# Patient Record
Sex: FEMALE | URBAN - METROPOLITAN AREA
[De-identification: names, ages, dates, MRNs, and addresses within clinical notes are randomized per-mention and may not be internally consistent; named-entity substitution may affect disease eponyms.]

---

## 2017-01-26 ENCOUNTER — ONCOLOGY VISIT (OUTPATIENT)
Dept: ONCOLOGY | Facility: CLINIC | Age: 37
End: 2017-01-26
Attending: GENETIC COUNSELOR, MS
Payer: COMMERCIAL

## 2017-01-26 DIAGNOSIS — Z80.41 FAMILY HISTORY OF MALIGNANT NEOPLASM OF OVARY: Primary | ICD-10-CM

## 2017-01-26 DIAGNOSIS — Z15.01 BRCA2 POSITIVE: ICD-10-CM

## 2017-01-26 DIAGNOSIS — Z80.42 FAMILY HISTORY OF PROSTATE CANCER: ICD-10-CM

## 2017-01-26 DIAGNOSIS — Z80.8 FAMILY HISTORY OF BRAIN CANCER: ICD-10-CM

## 2017-01-26 DIAGNOSIS — Z15.09 BRCA2 POSITIVE: ICD-10-CM

## 2017-01-26 PROCEDURE — 96040 ZZH GENETIC COUNSELING, EACH 30 MINUTES: CPT | Performed by: GENETIC COUNSELOR, MS

## 2017-01-26 NOTE — PROGRESS NOTES
1/26/2017    Referring Provider: self    Presenting Information:   Vee returned to the Cancer Risk Management Program at [facility] to discuss her genetic testing results. Her blood was drawn in December 2016. The Specific Site Analysis of BRCA2 test was ordered from [facility]. This testing was done because of her family history of ovarian and other cancers and a mutation in the BRCA2 gene identified in her family.    Genetic Testing Results: POSITIVE  Vee is POSITIVE for a BRCA2 mutation. Specifically her mutation is called [id number].  We discussed this mutation is associated with an increased risk for breast and ovarian cancer. We discussed the impact of this testing on Vee in detail. Vee was tested only for this single mutation in the BRCA2 gene.    BRCA2 Cancer Risks:  Women with mutations in BRCA2:    Have a 50-85% lifetime risk of developing breast cancer.    Lifetime ovarian cancer risk is also increased to 20%.    This is much greater than the general population breast and ovarian cancer risks of 12% and 1-2%, respectively.   Men with mutations in BRCA2:      Have a 20% lifetime prostate cancer risk.  This is greater than the general population risk of approximately 16%.      Male breast cancer risk is increased to 5-10%, This is greater than the general population risk of less than 1%.    Lifetime pancreatic cancer risk and melanoma risk in both males and females may be increased as well.   Cancer Screening and Prevention:  The following screening is recommended for women who have a mutation in the BRCA2 gene.    Breast self-exam starting at age 18; monthly     Clinical breast exams starting at age 25; repeated every 6-12 months    Annual breast MRI from age 25-29    Annual mammograms and breast MRI alternating every 6 months starting at age 30    Consider transvaginal ultrasound and a  blood test starting at age 30; repeated every 6 months    We discussed that prophylactic mastectomy is  a surgical option, which reduces breast cancer risk by 90%.  Chemoprevention with Tamoxifen can reduce breast cancer risk in some women.  Surgical risk reduction options and Tamoxifen use should be discussed with Vee's physician.      We have discussed the limitations of screening for ovarian cancer.  Thus, prophylactic removal of the ovaries and fallopian tubes is an option and is recommended after women are finished planning their families or between 35 and 40.  As with any surgery, risks are involved, and this option should be discussed in greater detail with a gyn-oncologist.      We discussed that using oral contraceptives for five years or more has been shown to reduce ovarian cancer risk by around 50%.  The data are still inconclusive as to whether or not using oral contraceptives will increase breast cancer risk in BRCA mutation carriers.     The following screening is recommended for men who have a mutation in the BRCA2 gene:    Breast self-exam starting at age 35    Annual clinical breast exams starting at age 35    Annual prostate exams and PSA testing starting at age 40    Screening for pancreatic cancer is not offered on a routine basis, as the benefits of current screening methods are unknown.  Pancreatic cancer screening may be considered based on family history.  Screening for melanoma may also be considered.  In rare situations in which both parents have a mutation in the BRCA2 gene, their children each have a 25% risk for Fanconi anemia. Fanconi anemia is a rare condition with onset in childhood.  Fanconi anemia often results in physical abnormalities, growth retardation, bone marrow failure, and increased risk for cancer.  In individuals of childbearing age with BRCA2 mutations, genetic counseling and genetic testing may be advised for their partners.    We discussed that Vee could participate in our Cancer Risk Management Program in which our nursing specialist provides an individual screening  "plan and assists with medical management. Vee made an appointment to see THOR Arriaga [facility] in a couple of weeks.    Implications for Family Members:  We reviewed that mutations in BRCA2 are inherited in an autosomal dominant pattern. This means that each of Vee's children have a 50% chance of inheriting the same mutation.  Likewise, her sister also has a 50% risk of having the same mutation. I am happy to help her relatives connect with a genetic counselor in their area if they would like to discuss testing.    Support Resources:  I provided Vee with handout that lists all the national and local support resources in our area.   Plan:  1.  I provided Vee with a copy of her test results and support resources today.  2.  I will provide a \"Dear Relative\" letter for Vee to share with her family members.  3.  She plans to follow up with Krupa Skinner in a few weeks in the Cancer Risk Management Program.    If  Vee has additional questions, I encouraged her to contact  me directly at .  Time spent face to face: 25  minutes.  Janie Gonzalez MS [facility]  Genetic Counselor  [phone]  "

## 2017-01-26 NOTE — PATIENT INSTRUCTIONS
Assessing Cancer Risk  Only about 5-10% of cancers are thought to be due to an inherited cancer susceptibility gene.    These families often have:    Several people with the same or related types of cancer    Cancers diagnosed at a young age (before age 50)    Individuals with more than one primary cancer    Multiple generations of the family affected with cancer    BRCA1 and BRCA2 Genes  We each inherit two copies of every gene in our bodies: one from our mother, and one from our father.  Each gene has a specific job to do.  When a gene has a mistake or  mutation  in it, it does not work like it should.  Everyone has two copies of BRCA1 and two copies of BRCA2.  A single mutation in one of the copies of BRCA1 or BRCA2 increases the risk for breast and ovarian cancer, among others.  The risk for pancreatic cancer and melanoma may also be slightly increased in some families.  The tables below list the chance that someone with a BRCA mutation would develop cancer in his or her lifetime1,2,3,4.      Women   Men    General Population BRCA +   General Population BRCA +   Breast 12% 40-85%  Breast <1% ~7%   Ovarian 1-2% 10-40%  Prostate 16% 20%     Inheriting a mutation does not mean a person will develop cancer, but it does significantly increase his or her risk above the general population risk.    A person s ethnic background is also important to consider, as individuals of Ashkenazi Christian ancestry have a higher chance of having a BRCA gene mutation.  There are three BRCA mutations that occur more frequently in this population.    Genetic Testing  Genetic testing involves a simple blood test and will look at the genetic information in the BRCA1 and BRCA2 genes for any harmful mutations that are associated with increased cancer risk.  If possible, it is recommended that the person(s) who has had cancer be tested before other family members.  That person will give us the most useful information about whether or not  a specific gene is associated with the cancer in the family.     Results  There are three possible results of BRCA1 and BRCA2 genetic testing:    Positive--a harmful mutation was identified    Negative--no mutation was identified    Variant of unknown significance--a variation in one of the genes was identified, but it is unclear how this impacts cancer risk in the family  Advantages and Disadvantages  There are advantages and disadvantages to genetic testing of these genes.    Advantages    May clarify your cancer risk    Can help you make medical decisions    May explain the cancers in your family    May give useful information to your family members (if you share your results)    Disadvantages    Possible negative emotional impact of learning about inherited cancer risk    Uncertainty in interpreting a negative test result in some situations    Possible genetic discrimination concerns (see below)    Inheritance   BRCA1 and BRCA2 mutations are inherited in an autosomal dominant pattern.  This means that if a parent has a mutation, each of his or her children will have a 50% chance of inheriting that same mutation.  Therefore, each child--male or female--would have a 50% chance of being at increased risk for developing cancer.                                              Image obtained from Genetics Home Reference, 2013     Genetic Information Nondiscrimination Act (COARL)  CORAL is a federal law that protects individuals from health insurance or employment discrimination based on a genetic test result alone.  Although rare, there are currently no legal protections in terms of life insurance, long term care, or disability insurances.  Visit the National Human Genome Research [place] at Genome.gov/94996794 to learn more.    Reducing Cancer Risk  Current screening recommendations for women with a BRCA mutation include5:    Breast:  o Breast self-exams starting at age 18; monthly  o Clinical breast exams starting at  age 25; repeated every 6-12 months  o Annual breast MRI starting at age 25 (or possibly younger)  o Annual mammogram and breast MRI at age 30 (for women with and without a breast cancer history)  o Consideration of preventative mastectomy    Ovarian:   o Consideration of transvaginal ultrasound and CA-125 blood test starting at age 30 (or possibly younger); repeated every 6 months  o Recommendation for surgery to remove the ovaries and fallopian tubes after child bearing or by age 35-40     Current screening recommendations for men with a BRCA mutation include5:    Breast:  o Self-breast exams starting at age 35  o Annual clinical breast exams starting at age 35    Prostate:   o Recommend prostate cancer screening starting at age 40 for BRCA2 carriers  o Consider prostate cancer screening starting at age 40 for BRCA1 carriers    Both men and women with BRCA mutations should talk to their doctor or genetic counselor about screening for pancreatic cancer and melanoma.  In addition, the age at which to start screening may be modified based on family history of young cancer.    Questions to Think About Regarding Genetic Testing    What effect will the test result have on me and my relationship with my family members if I have an inherited gene mutation?  If I don t have a gene mutation?    Should I share my test results, and how will my family react to this news, which may also affect them?    Are my children ready to learn new information that may one day affect their own health?    Resources  FORCE: Facing Our Risk of Cancer Empowered facingourrisk.org   [place] Pink bebrightpink.org   American Cancer Society (ACS) cancer.org   National Cancer [place] (NCI) cancer.gov     Please call us if you have any questions or concerns.     Cancer Risk Management Program [phone]  ? Janie Gonzalez, MS, [facility]  [phone]  ? Nellie Hines, MS [facility]          [phone]  ? Brooke Erickson MS,  [facility]  [phone]  ? Georgina Johnson, MS, [facility]  [phone]      Resources for Hereditary Breast and Ovarian Cancer  Bright Pink    www.MojoPages.org  [employer] is the only national non-profit organization focused on prevention and early detection of breast and ovarian cancer in young women.  Bright Pink aims to reach the 52 million young women in the [place] between the ages of 18 and 45 with innovative, life-saving breast and ovarian health programs, thereby empowering this and future generations of women to live healthier, happier, and longer lives.  FORCE: Facing Our Risk of Cancer Empowered  www.facingChosen.fm.org  FORCE s mission is to improve the lives of individuals and families affected by hereditary breast, ovarian, and related cancers by creating awareness, supplying information and support to the community, advocating for and supporting research, and working with the research and medical communities.     Helpline: 1-171.795.4543    Message boards    Peer Navigation and local support groups  Talking About BRCA in Your Family Tree  http://www.Ambric.org/understanding-brca-and-hboc/publications/documents/ryvvdpx-cixikhc-lokyg-brca-family.pdf  MOCA: [place] Ovarian Cancer [place] http://mnovarian.org/  MOCA was started in 1999 by a small group of ovarian cancer survivors who came together to fund ovarian cancer research, raise awareness of the disease, and provide support to women with ovarian cancer and their families.  Firefly Sisterhood   www.fireflysisterhood.org  Based in the [facility], the Firefly Sisterhood connects women diagnosed with breast cancer with trained breast cancer survivors to offer support, guidance and hope.  [employer] [place] www.Beloor[employer] Biotechsclubtwincities.org  [employer] [place] is a 501(c)3 nonprofit and the local affiliate of the Cancer Support Community, a network of more than 54 supportive, free and welcoming  clubhouses  where everyone living with cancer can  come for social, emotional and psychological support. Clubs are healing environments where individuals learn from each other with guidance from licensed professionals.  BRCA Information Support Group  People with BRCA1 or BRAC2 mutations face complex emotional challenges and complicated medical decisions due to high cancer risks and their impact on individuals and families. This group brings people with a BRAC1 or BRAC2 genetic diagnosis together with others facing similar challenges. The group meets nine times per year. For information, please contact [email or web address] or call [phone].  Books:  The Breast Reconstruction Guidebook: Issues and Answers from Research to Recovery, Elizabeth Sanchez  Confronting Hereditary Breast and Ovarian Cancer: Identify Your Risk, Understand Your Options, Change Your Geeta, Sendy Cantor

## 2017-01-26 NOTE — Clinical Note
Cancer Risk Management  Program Locations    [facility]  [facility]  [facility]  Mailing Address  Cancer Risk Management Program  [facility]  [address]   [id number]    New patient appointments  [phone]  January 26, 2017    Vee Rae  [address] [address]      Dear Vee,  This is a summary of your genetic counseling visit to discuss your genetic test results.    1/26/2017    Referring Provider: self    Presenting Information:   Vee returned to the Cancer Risk Management Program at [facility] to discuss her genetic testing results. Her blood was drawn in December 2016. The Specific Site Analysis of BRCA2 test was ordered from [facility]. This testing was done because of her family history of ovarian and other cancers and a mutation in the BRCA2 gene identified in her family.    Genetic Testing Results: POSITIVE  Vee is POSITIVE for a BRCA2 mutation. Specifically her mutation is called [id number].  We discussed this mutation is associated with an increased risk for breast and ovarian cancer. We discussed the impact of this testing on Vee in detail. Vee was tested only for this single mutation in the BRCA2 gene.    BRCA2 Cancer Risks:  Women with mutations in BRCA2:    Have a 50-85% lifetime risk of developing breast cancer.    Lifetime ovarian cancer risk is also increased to 20%.    This is much greater than the general population breast and ovarian cancer risks of 12% and 1-2%, respectively.   Men with mutations in BRCA2:      Have a 20% lifetime prostate cancer risk.  This is greater than the general population risk of approximately 16%.      Male breast cancer risk is increased to 5-10%, This is greater than the general population risk of less than 1%.    Lifetime pancreatic  cancer risk and melanoma risk in both males and females may be increased as well.   Cancer Screening and Prevention:  The following screening is recommended for women who have a mutation in the BRCA2 gene.    Breast self-exam starting at age 18; monthly     Clinical breast exams starting at age 25; repeated every 6-12 months    Annual breast MRI from age 25-29    Annual mammograms and breast MRI alternating every 6 months starting at age 30    Consider transvaginal ultrasound and a  blood test starting at age 30; repeated every 6 months    We discussed that prophylactic mastectomy is a surgical option, which reduces breast cancer risk by 90%.  Chemoprevention with Tamoxifen can reduce breast cancer risk in some women.  Surgical risk reduction options and Tamoxifen use should be discussed with Vee's physician.      We have discussed the limitations of screening for ovarian cancer.  Thus, prophylactic removal of the ovaries and fallopian tubes is an option and is recommended after women are finished planning their families or between 35 and 40.  As with any surgery, risks are involved, and this option should be discussed in greater detail with a gyn-oncologist.      We discussed that using oral contraceptives for five years or more has been shown to reduce ovarian cancer risk by around 50%.  The data are still inconclusive as to whether or not using oral contraceptives will increase breast cancer risk in BRCA mutation carriers.     The following screening is recommended for men who have a mutation in the BRCA2 gene:    Breast self-exam starting at age 35    Annual clinical breast exams starting at age 35    Annual prostate exams and PSA testing starting at age 40    Screening for pancreatic cancer is not offered on a routine basis, as the benefits of current screening methods are unknown.  Pancreatic cancer screening may be considered based on family history.  Screening for melanoma may also be considered.  In  "rare situations in which both parents have a mutation in the BRCA2 gene, their children each have a 25% risk for Fanconi anemia. Fanconi anemia is a rare condition with onset in childhood.  Fanconi anemia often results in physical abnormalities, growth retardation, bone marrow failure, and increased risk for cancer.  In individuals of childbearing age with BRCA2 mutations, genetic counseling and genetic testing may be advised for their partners.    We discussed that Vee could participate in our Cancer Risk Management Program in which our nursing specialist provides an individual screening plan and assists with medical management. Vee made an appointment to see THOR Arriaga [facility] in a couple of weeks.    Implications for Family Members:  We reviewed that mutations in BRCA2 are inherited in an autosomal dominant pattern. This means that each of Vee's children have a 50% chance of inheriting the same mutation.  Likewise, her sister also has a 50% risk of having the same mutation. I am happy to help her relatives connect with a genetic counselor in their area if they would like to discuss testing.    Support Resources:  I provided Vee with handout that lists all the national and local support resources in our area.   Plan:  1.  I provided Vee with a copy of her test results and support resources today.  2.  I will provide a \"Dear Relative\" letter for Vee to share with her family members.  3.  She plans to follow up with Krupa Skinner in a few weeks in the Cancer Risk Management Program.    If  Vee has additional questions, I encouraged her to contact  me directly at .  Time spent face to face: 25  minutes.  Please call me if you have any additional questions.    Sincerely,    Janie Gonzalez, MS [facility]  Genetic Counselor  [phone] (phone)                      "

## 2017-01-26 NOTE — MR AVS SNAPSHOT
After Visit Summary   1/26/2017    Vee Rae    MRN: [id number]           Patient Information     Date Of Birth          1980        Visit Information        Provider Department      1/26/2017 10:15 AM Janie Gonzalez GC Cancer Risk Management Program        Care Instructions        Assessing Cancer Risk  Only about 5-10% of cancers are thought to be due to an inherited cancer susceptibility gene.    These families often have:    Several people with the same or related types of cancer    Cancers diagnosed at a young age (before age 50)    Individuals with more than one primary cancer    Multiple generations of the family affected with cancer    BRCA1 and BRCA2 Genes  We each inherit two copies of every gene in our bodies: one from our mother, and one from our father.  Each gene has a specific job to do.  When a gene has a mistake or  mutation  in it, it does not work like it should.  Everyone has two copies of BRCA1 and two copies of BRCA2.  A single mutation in one of the copies of BRCA1 or BRCA2 increases the risk for breast and ovarian cancer, among others.  The risk for pancreatic cancer and melanoma may also be slightly increased in some families.  The tables below list the chance that someone with a BRCA mutation would develop cancer in his or her lifetime1,2,3,4.      Women   Men    General Population BRCA +   General Population BRCA +   Breast 12% 40-85%  Breast <1% ~7%   Ovarian 1-2% 10-40%  Prostate 16% 20%     Inheriting a mutation does not mean a person will develop cancer, but it does significantly increase his or her risk above the general population risk.    A person s ethnic background is also important to consider, as individuals of Ashkenazi Judaism ancestry have a higher chance of having a BRCA gene mutation.  There are three BRCA mutations that occur more frequently in this population.    Genetic Testing  Genetic testing involves a simple blood test and will look at the genetic  information in the BRCA1 and BRCA2 genes for any harmful mutations that are associated with increased cancer risk.  If possible, it is recommended that the person(s) who has had cancer be tested before other family members.  That person will give us the most useful information about whether or not a specific gene is associated with the cancer in the family.     Results  There are three possible results of BRCA1 and BRCA2 genetic testing:    Positive--a harmful mutation was identified    Negative--no mutation was identified    Variant of unknown significance--a variation in one of the genes was identified, but it is unclear how this impacts cancer risk in the family  Advantages and Disadvantages  There are advantages and disadvantages to genetic testing of these genes.    Advantages    May clarify your cancer risk    Can help you make medical decisions    May explain the cancers in your family    May give useful information to your family members (if you share your results)    Disadvantages    Possible negative emotional impact of learning about inherited cancer risk    Uncertainty in interpreting a negative test result in some situations    Possible genetic discrimination concerns (see below)    Inheritance   BRCA1 and BRCA2 mutations are inherited in an autosomal dominant pattern.  This means that if a parent has a mutation, each of his or her children will have a 50% chance of inheriting that same mutation.  Therefore, each child--male or female--would have a 50% chance of being at increased risk for developing cancer.                                              Image obtained from Genetics Home Reference, 2013     Genetic Information Nondiscrimination Act (CORAL)  CORAL is a federal law that protects individuals from health insurance or employment discrimination based on a genetic test result alone.  Although rare, there are currently no legal protections in terms of life insurance, long term care, or disability  insurances.  Visit the National Human [employer] Research [employer]re at Genome.gov/43185091 to learn more.    Reducing Cancer Risk  Current screening recommendations for women with a BRCA mutation include5:    Breast:  o Breast self-exams starting at age 18; monthly  o Clinical breast exams starting at age 25; repeated every 6-12 months  o Annual breast MRI starting at age 25 (or possibly younger)  o Annual mammogram and breast MRI at age 30 (for women with and without a breast cancer history)  o Consideration of preventative mastectomy    Ovarian:   o Consideration of transvaginal ultrasound and CA-125 blood test starting at age 30 (or possibly younger); repeated every 6 months  o Recommendation for surgery to remove the ovaries and fallopian tubes after child bearing or by age 35-40     Current screening recommendations for men with a BRCA mutation include5:    Breast:  o Self-breast exams starting at age 35  o Annual clinical breast exams starting at age 35    Prostate:   o Recommend prostate cancer screening starting at age 40 for BRCA2 carriers  o Consider prostate cancer screening starting at age 40 for BRCA1 carriers    Both men and women with BRCA mutations should talk to their doctor or genetic counselor about screening for pancreatic cancer and melanoma.  In addition, the age at which to start screening may be modified based on family history of young cancer.    Questions to Think About Regarding Genetic Testing    What effect will the test result have on me and my relationship with my family members if I have an inherited gene mutation?  If I don t have a gene mutation?    Should I share my test results, and how will my family react to this news, which may also affect them?    Are my children ready to learn new information that may one day affect their own health?    Resources  FORCE: Facing Our Risk of Cancer Empowered facingourrisk.org   Bright Pink bebrightpink.org   American Cancer Society (ACS) cancer.org    National Cancer [place] (NCI) cancer.gov     Please call us if you have any questions or concerns.     Cancer Risk Management Program [phone]  ? Janie Gonzalez, MS, [facility]  [phone]  ? Nellie Hines, MS [facility]          [phone]  ? Brooke Erickson, MS, [facility]  [phone]  ? Georgina Johnson, MS, [facility]  [phone]      Resources for Hereditary Breast and Ovarian Cancer  Bright Pink    www.Catarizm.org  Bright Pink is the only national non-profit organization focused on prevention and early detection of breast and ovarian cancer in young women.  Bright Pink aims to reach the 52 million young women in the [place] between the ages of 18 and 45 with innovative, life-saving breast and ovarian health programs, thereby empowering this and future generations of women to live healthier, happier, and longer lives.  FORCE: Facing Our Risk of Cancer Empowered  www.Pogoapp.org  FORCE s mission is to improve the lives of individuals and families affected by hereditary breast, ovarian, and related cancers by creating awareness, supplying information and support to the community, advocating for and supporting research, and working with the research and medical communities.     Helpline: 1-389.672.9199    Message boards    Peer Navigation and local support groups  Talking About BRCA in Your Family Tree  http://www.facingShipping Company.org/understanding-brca-and-hboc/publications/documents/xjlumvm-beomrws-upbaj-brca-family.pdf  MOCA: [place] Ovarian Cancer [place] http://mnovarian.org/  MOCA was started in 1999 by a small group of ovarian cancer survivors who came together to fund ovarian cancer research, raise awareness of the disease, and provide support to women with ovarian cancer and their families.  Firefly Sisterhood   www.ReCellularsisterhood.org  Based in the [facility] the Firefly Sisterhood connects women diagnosed with breast cancer with trained breast cancer survivors to offer support, guidance  and hope.  [employer] [place] www.deviMarco Vascosclubtwincities.org  [employer] [place] is a 501(c)3 nonprofit and the local affiliate of the Cancer Support Community, a network of more than 54 supportive, free and welcoming  clubhouses  where everyone living with cancer can come for social, emotional and psychological support. Clubs are healing environments where individuals learn from each other with guidance from licensed professionals.  BRCA Information Support Group  People with BRCA1 or BRAC2 mutations face complex emotional challenges and complicated medical decisions due to high cancer risks and their impact on individuals and families. This group brings people with a BRAC1 or BRAC2 genetic diagnosis together with others facing similar challenges. The group meets nine times per year. For information, please contact [email or web address]@[employer] or call [phone].  Books:  The Breast Reconstruction Guidebook: Issues and Answers from Research to Recovery, Elizabeth Sanchez  Confronting Hereditary Breast and Ovarian Cancer: Identify Your Risk, Understand Your Options, Change Your Geeta, Sendy Cantor        Follow-ups after your visit        Who to contact     If you have questions or need follow up information about today's clinic visit or your schedule please contact CANCER RISK MANAGEMENT PROGRAM directly at [phone].  Normal or non-critical lab and imaging results will be communicated to you by MyChart, letter or phone within 4 business days after the clinic has received the results. If you do not hear from us within 7 days, please contact the clinic through MyChart or phone. If you have a critical or abnormal lab result, we will notify you by phone as soon as possible.  Submit refill requests through [employer] or call your pharmacy and they will forward the refill request to us. Please allow 3 business days for your refill to be completed.          Additional Information About Your Visit       "  MyChart Information     [employer] lets you send messages to your doctor, view your test results, renew your prescriptions, schedule appointments and more. To sign up, go to [email or web address]/[email or web address] . Click on \"Log in\" on the left side of the screen, which will take you to the Welcome page. Then click on \"Sign up Now\" on the right side of the page.     You will be asked to enter the access code listed below, as well as some personal information. Please follow the directions to create your username and password.     Your access code is: [email or web address]  Expires: 3/7/2017 12:31 PM     Your access code will  in 90 days. If you need help or a new code, please call your [place] clinic or [phone].        Care EveryWhere ID     This is your Care EveryWhere ID. This could be used by other organizations to access your [place] medical records  [id number]         Blood Pressure from Last 3 Encounters:   No data found for BP    Weight from Last 3 Encounters:   No data found for Wt              Today, you had the following     No orders found for display       Primary Care Provider Office Phone # Fax #    Marianne Uribe [phone]       [facility]  [address]  [facility] [address]        Thank you!     Thank you for choosing CANCER RISK MANAGEMENT PROGRAM  for your care. Our goal is always to provide you with excellent care. Hearing back from our patients is one way we can continue to improve our services. Please take a few minutes to complete the written survey that you may receive in the mail after your visit with us. Thank you!             Your Updated Medication List - Protect others around you: Learn how to safely use, store and throw away your medicines at www.disposemymeds.org.      Notice  As of 2017 10:17 AM    You have not been prescribed any medications.      "

## 2017-01-26 NOTE — Clinical Note
Cancer Risk Management  Program [facility]  [facility]  Mailing Address  Cancer Risk Management Program  [facility]  [address]  [place] [address]    New patient appointments  [phone]  1/26/2017    Dear Relative:  The purpose of this letter is to inform you that your relative recently underwent genetic counseling and genetic testing due to her family historyof ovarian cancer.  The testing done through [employer] identified a mutation in the BRCA2 gene.  Specifically, the mutation is called [id number].  A mutation (or change in the genetic code) causes a specific gene to stop working properly.  Ultimately, individuals who have a mutation in this BRCA2 gene have a diagnosis of hereditary breast and ovarian cancer syndrome.    BRCA2 mutations increase the lifetime risk of breast cancer up to 50-85%.  They also increase the lifetime risk for ovarian cancer up to 10-20%.  Men with this BRCA2 mutation are at increased risk for male breast cancer and prostate cancer.  Some families with BRCA2 mutations show increased risk of pancreatic cancer or melanoma.  In addition, both men and women have a 50% chance of passing this mutation on to each of their children.  If individuals are found to have a mutation in the BRCA2 gene, we would recommend increased cancer screening at younger ages.  Risk reduction surgeries are also available options that can prevent the development of certain cancers.  In rare situations in which both parents have a mutation in the BRCA2 gene, their children each have a 25% risk for Fanconi anemia. Fanconi anemia is a rare condition with onset in childhood.  Fanconi anemia often results in physical abnormalities, growth retardation, bone marrow failure, and increased risk  for cancer.  In individuals of childbearing age with BRCA2 mutations, genetic counseling and genetic testing may be advised for their partners.  I understand that this may be surprising, unexpected, and even scary news.  Because this mutation has been identified in your family, you are at risk for having the same mutation.  As mentioned earlier, your children may also be at risk.  Thus, I encourage you to contact me with questions or to schedule a genetic counseling appointment.  If you do not live in the [facility] area, I would be happy to provide you with contact information for genetic counselors in your city or state.  Genetic counselors can be found by visiting www.nsgc.org.   Scheduling a genetic counseling appointment does not mean you have to undergo genetic testing.  The decision to pursue such testing is a very personal one that is discussed in more detail during the session.  Indeed, much of cancer genetic counseling is providing valuable information to individuals who are impacted by genetic information such as this.    Please feel free to contact me with any questions or concerns.  I can be reached at .  Sincerely,   Janie Gonzalez MS [facility]  Genetic Counselor

## 2017-02-07 DIAGNOSIS — Z91.89 AT RISK FOR CANCER: ICD-10-CM

## 2017-02-07 DIAGNOSIS — Z15.01 BRCA2 POSITIVE: ICD-10-CM

## 2017-02-07 DIAGNOSIS — Z12.39 BREAST CANCER SCREENING, HIGH RISK PATIENT: Primary | ICD-10-CM

## 2017-02-07 DIAGNOSIS — Z80.41 FAMILY HISTORY OF OVARIAN CANCER: ICD-10-CM

## 2017-02-07 DIAGNOSIS — Z15.09 BRCA2 POSITIVE: ICD-10-CM

## 2017-02-09 ENCOUNTER — ONCOLOGY VISIT (OUTPATIENT)
Dept: ONCOLOGY | Facility: CLINIC | Age: 37
End: 2017-02-09
Attending: CLINICAL NURSE SPECIALIST
Payer: COMMERCIAL

## 2017-02-09 ENCOUNTER — HOSPITAL ENCOUNTER (OUTPATIENT)
Dept: ULTRASOUND IMAGING | Facility: CLINIC | Age: 37
Discharge: HOME | End: 2017-02-09
Attending: CLINICAL NURSE SPECIALIST | Admitting: CLINICAL NURSE SPECIALIST
Payer: COMMERCIAL

## 2017-02-09 VITALS
BODY MASS INDEX: 20.25 KG/M2 | TEMPERATURE: 99.8 F | DIASTOLIC BLOOD PRESSURE: 89 MMHG | WEIGHT: 129 LBS | OXYGEN SATURATION: 99 % | HEART RATE: 76 BPM | SYSTOLIC BLOOD PRESSURE: 129 MMHG | HEIGHT: 67 IN | RESPIRATION RATE: 16 BRPM

## 2017-02-09 DIAGNOSIS — Z80.41 FAMILY HISTORY OF OVARIAN CANCER: ICD-10-CM

## 2017-02-09 DIAGNOSIS — Z15.01 BRCA2 POSITIVE: ICD-10-CM

## 2017-02-09 DIAGNOSIS — Z15.09 BRCA2 POSITIVE: ICD-10-CM

## 2017-02-09 DIAGNOSIS — Z91.89 AT RISK FOR CANCER: ICD-10-CM

## 2017-02-09 LAB — CANCER AG125 SERPL-ACNC: 36 U/ML (ref 0–30)

## 2017-02-09 PROCEDURE — 36415 COLL VENOUS BLD VENIPUNCTURE: CPT

## 2017-02-09 PROCEDURE — 76830 TRANSVAGINAL US NON-OB: CPT

## 2017-02-09 PROCEDURE — 99215 OFFICE O/P EST HI 40 MIN: CPT | Performed by: CLINICAL NURSE SPECIALIST

## 2017-02-09 PROCEDURE — 86304 IMMUNOASSAY TUMOR CA 125: CPT | Performed by: CLINICAL NURSE SPECIALIST

## 2017-02-09 PROCEDURE — 99211 OFF/OP EST MAY X REQ PHY/QHP: CPT

## 2017-02-09 ASSESSMENT — PAIN SCALES - GENERAL: PAINLEVEL: NO PAIN (0)

## 2017-02-09 NOTE — MR AVS SNAPSHOT
After Visit Summary   2/9/2017    Vee Rae    MRN: [id number]           Patient Information     Date Of Birth          1980        Visit Information        Provider Department      2/9/2017 9:00 AM Krupa Skinner APRN [facility]        Care Instructions    Individualized Surveillance Plan for women  Hereditary Breast and/or Ovarian Cancer Syndrome   Per NCCN Guidelines Version 2.2017   Recommended screening Test or procedure Last done Next Scheduled    Breast self awareness- starting at age 18. Women should be familiar with their breasts and promptly report changes to their care provider. Periodic, consistent self exam may facilitate breast self awareness.  2/9/2017 Sept. 2017   Breast screening, starting at age 25 Clinical breast exams every 6 -12 months 2/9/2017 Sept. 2017   Breast screening   Age 25-29 Annual breast MRI screening with contrast (preferred) or mammogram if MRI is unavailable or individualized based on family history if a breast cancer diagnosis before age 30 is present.       Breast MRI is performed preferably on day 7-15 of menstrual cycle for premenopausal women.   NA   NA   Breast screening   Age >30-75 years     Annual mammogram and   annual breast MRI, preferably on day 7-15 of menstrual cycle for premenopausal women.    Age>75 years, management should be considered on an individual basis. Baseline mammogram ordered today NEXT: Breast MRI in August 2017 after exam   Ovarian cancer screening, starting at age 30-35 Consider transvaginal ultrasound and  tests.  and pelvic US ordered today Sept. 2017   Recommendation: risk-reducing salpingo-oophorectomy, typically between 35 and 40 years old and  upon completion of child bearing.     Because ovarian cancer onset in patients with BRCA2 mutations is on average of 8-10 years later than in patients with BRCA1 mutations, it is reasonable to delay risk-reducing salpingo-oophorectomy until  40-45 years in patients with BRCA2 mutations who have already maximized their breast cancer prevention (i.e., undergone bilateral mastectomy).    Salpingectomy alone is not the standard of care for risk reduction although clinical trials are ongoing.     Discuss option of risk-reducing mastectomy.    Consider risks and benefits of risk reducing agents, such as tamoxifen and raloxifene for breast and ovarian cancer.    Consider a full body skin and eye exam for melanoma screening for both BRCA1+ and BRCA2+.     NOTE: Women with BRCA mutation who are treated for breast cancer should have screening of the remaining breast tissue with annual mammography and breast MRI.    The International Cancer of the Pancreas Screening (CAPS) Consortium recommends that individuals with a BRCA2 mutation who have at least one first-degree relative with pancreatic cancer should undergo initial screening with endoscopic ultrasonography and/or MRI/magnetic resonance cholangiopancreatography.         Magnetic Resonance Imaging (MRI) of the Breast  Magnetic resonance imaging (MRI) of the breast is an imaging test that uses strong magnets and radio waves to form pictures of the inside of the breast. It also creates images of the tissues that surround the breast. Breast MRI is used to check for problems, such as a leaking breast implant or a suspicious lump or mass. It can also be used to help determine if breast cancer is present and aid in diagnosis and management. Most MRI tests take 30 to 60 minutes. Depending on the type of MRI you are having, the test may take longer. Give yourself extra time to check in.      During a breast MRI, you lie face down on a platform that slides into a tubelike machine called a scanner.   Before your test    You may need to stop eating or drinking before the test. Each health care facility has its own guidelines on this. It also depends on the type of exam you are having. Ask your health care provider if you  should stop eating or drinking before the test.    Ask your provider if you should stop taking any medicine before the test.    Follow your normal daily routine unless your provider tells you otherwise.    You ll be asked to remove your watch, hearing aids, credit cards, pens, eyeglasses, and other metal objects.    You may be asked to remove your makeup. Makeup may contain some metal.  MRI uses strong magnets. Metal is affected by magnets and can distort the image. The magnet used in MRI can cause metal objects in your body to move. If you have a metal implant, you may not be able to have an MRI unless the implant is certified as MRI safe. People with these implants should not have an MRI:    Ear (cochlear) implants    Certain clips used for brain aneurysms    Certain metal coils put in blood vessels    Most defibrillators    Most pacemakers  The radiologist or technologist may ask you if you:    Have had stereotactic breast biopsy    Have a pacemaker    Have an artificial body part (prosthesis)    Have metal rods, screws, plates, or splinters in your body    Wear a medicated adhesive patch    Have tattoos or body piercings. Some tattoo inks contain metal.    Have implanted nerve stimulators or drug-infusion ports    Work with metal    Have braces. You must remove any dental work.    Have a bullet or other metal in your body  Tell the radiologist or technologist if you:    Are allergic to X-ray dye (contrast medium), iodine, shellfish, or any medicines    Are pregnant or think you may be pregnant    Are afraid of small, enclosed spaces (claustrophobic)    Have any serious health problems. This includes kidney disease or a liver transplant. You may not be able to have the contrast material used for MRI.    Have had previous surgeries    Are breastfeeding   During your test    You may be asked to wear a hospital gown.    You may be given earplugs to wear if you desire.    You may be injected with contrast. This is  a special dye that makes the MRI image sharp that may be needed depending on the reason for your exam.    You ll lie on a platform that slides into a tubelike machine called a scanner. You ll be on your stomach with your breasts placed through openings in the platform.    Remain as still as you can while the camera takes the pictures. This will ensure the best images.  After your test    You can get back to normal activities right away.    If you were given contrast, it will pass naturally through your body within a day.    Drink lots of water so that the dye passes quickly out of your body.  Getting your results  Your health care provider will discuss the test results with you during a follow-up appointment or over the phone.    3695-5512 [employer]. [address] [place] [address], [place] [address]. All rights reserved. This information is not intended as a substitute for professional medical care. Always follow your healthcare professional's instructions.        Mammography  Mammography is an X-ray exam of your breast tissue. The image it makes is called a mammogram. A mammogram can help find problems with your breasts, such as cysts or cancer. Mammography is the best breast cancer screening tool available.     Have screening mammograms and professional breast exams as often as your healthcare provider recommends. Also, be sure you know how your breasts normally look and feel. This makes it easier to notice any changes. Report changes to your healthcare provider as soon as possible.    How do I get ready for a mammogram?     Schedule the test for 1 week after your period. Your breasts are less sore then.    Make sure your clinic gets images of your last mammogram if it was done somewhere else. This lets the provider compare the 2 sets of images for any changes.    On the morning of your test, don t use deodorant, powder, or perfume.    Wear a top that you can take off easily.    What happens during a  mammogram?     You will need to undress from the waist up.    The technologist will position your breast to get the best test results.    Each of your breasts will be compressed one at a time. This helps get the most complete X-ray image.    Your breasts will be repositioned to get at least 2 separate views of each breast.  What happens after a mammogram?    More X-rays are sometimes needed. You ll be called to schedule them.    You should receive your test results in writing. Ask about this on the day of your appointment.    Have mammograms as often as your healthcare provider recommends.  Let the technologist know if:    You re pregnant or think you may be pregnant    You have breast implants    You have any scars or moles on or near your breasts    You ve had a breast biopsy or surgery    You re breastfeeding     0151-9709 [employer]. [address] [place] [address]. All rights reserved. This information is not intended as a substitute for professional medical care. Always follow your healthcare professional's instructions.        Transvaginal Ultrasound (Endovaginal Ultrasound)  A transvaginal ultrasound is an imaging test. An ultrasound uses sound waves to form pictures of your organs that can be seen on a screen. It is often done with a probe placed on the belly. A transvaginal ultrasound uses a special probe that is put into your vagina. It uses sound waves to make pictures of your uterus, ovaries, and other pelvic organs. This test can be used to check symptoms such as pain. It can also check for problems. In pregnant women, it is used to check the unborn baby or fetus. The test is often done by a specially trained technologist called a sonographer.    Getting ready for your test    You may be asked to go to the bathroom. Your bladder may need to be empty before the test.    Tell the sonographer what medicines you take. Let him or her know if you have had pelvic surgery.    Answer  any other questions the sonographer asks. Your answers will help him or her adapt the test to your health needs.  During your test    You may change into a hospital gown. You'll then lie down on an exam table with your knees raised, as you would for a pelvic exam.    The sonographer will use a thin hand-held probe. It is shaped like a tampon. The probe has a sterile cover and nongreasy gel. It is put inside your vagina. In some cases, you may be asked to put the probe in yourself, as you would a tampon.    The sonographer moves the probe to get the best picture. You may feel pressure. Tell the sonographer if you feel pain.  After your test  Before leaving, you may need to wait for a short time while the images are reviewed. You can go back to your normal routine right after the test. Your doctor will let you know when the results of your test are ready.  Be aware that although the sonographer can answer questions about the test, only a doctor can explain the results.     5781-7136 [employer]. [address] [place] [address]. All rights reserved. This information is not intended as a substitute for professional medical care. Always follow your healthcare professional's instructions.              Follow-ups after your visit        Follow-up notes from your care team     Return in about 7 months (around 9/1/2017) for Physical Exam, Lab Work.      Your next 10 appointments already scheduled     Feb 09, 2017 10:30 AM   US TRANSVAGINAL NON OB with SHUS1   [facility]ound ([facility])    [address] [place] [address] [phone]           Please bring a list of your medicines (including vitamins, minerals and over-the-counter drugs). Also, tell your doctor about any allergies you may have. Wear comfortable clothes and leave your valuables at home.  You do not need to do anything special to prepare for your exam.  Please call the Imaging Department  at your exam site with any questions.            Sep 07, 2017  9:00 AM   Return Visit with THOR Cardona CNS   Can[facility] Risk Management Program ([facility])    [facility]  [address]  [place] [address] [phone]            Sep 07, 2017  9:30 AM   Return Visit with  Oncology Nurse   [facility] ([facility])    [facility] [place] [facility]  [address]  [place] [address] [phone]              Future tests that were ordered for you today     Open Future Orders        Priority Expected Expires Ordered    US Transvaginal Non OB Routine  2/8/2018 2/8/2017            Who to contact     If you have questions or need follow up information about today's clinic visit or your schedule please contact [facility] directly at [phone].  Normal or non-critical lab and imaging results will be communicated to you by [employer], letter or phone within 4 business days after the clinic has received the results. If you do not hear from us within 7 days, please contact the clinic through [employer] or phone. If you have a critical or abnormal lab result, we will notify you by phone as soon as possible.  Submit refill requests through [employer] or call your pharmacy and they will forward the refill request to us. Please allow 3 business days for your refill to be completed.          Additional Information About Your Visit        [employer] Information     [employer] gives you secure access to your electronic health record. If you see a primary care provider, you can also send messages to your care team and make appointments. If you have questions, please call your primary care clinic.  If you do not have a primary care provider, please call [phone] and they will assist you.        Care EveryWhere ID     This is your Care EveryWhere ID. This could be used by other organizations to access your [facility]  medical records  [id number]        Your Vitals Were     Pulse Temperature Respirations Pulse Oximetry          76 99.8  F (37.7  C) (Oral) 16 99%         Blood Pressure from Last 3 Encounters:   02/09/17 129/89    Weight from Last 3 Encounters:   02/09/17 58.514 kg (129 lb)              Today, you had the following     No orders found for display       Primary Care Provider Office Phone # Fax #    Marianne Uribe [phone]       [facility]  [address]  [facility] [address]        Thank you!     Thank you for choosing [facility]  for your care. Our goal is always to provide you with excellent care. Hearing back from our patients is one way we can continue to improve our services. Please take a few minutes to complete the written survey that you may receive in the mail after your visit with us. Thank you!             Your Updated Medication List - Protect others around you: Learn how to safely use, store and throw away your medicines at www.disposemymeds.org.          This list is accurate as of: 2/9/17 10:21 AM.  Always use your most recent med list.                   Brand Name Dispense Instructions for use    levonorgestrel 20 MCG/24HR IUD    MIRENA     1 each by Intrauterine route once

## 2017-02-09 NOTE — PROGRESS NOTES
Oncology Risk Management Consultation:  Date on this visit: 2/9/2017    Vee Rae  is referred by Janie Gonzalez, Certified Genetic Counselor, for an oncology risk management consultation. She requires evaluation for her risk of cancer secondary to having a deleterious BRCA2 mutation and is considered to be at high risk for hereditary breast and ovarian cancer.    Primary Physician: Marianne Uribe MD    History Of Present Illness:  Ms. Rae is a very pleasant, healthy 36 year old female who presents with family history of breast and ovarian cancer and a personal diagnosis of a BRCA2 mutation.     Pertinent history:  12/2016 - BRCA2+ genetic mutation found, using single site analysis through [facility].  The genetic testing was done to determine whether she had the same genetic mutation as her mother, who had ovarian cancer at age 64.    Menarche at age 14.  First child at age 30.  Birth control: Mirena IUD, since 2016.   1st child for 6 months, 2nd child for 10 months and 3rd child for 13-14 months. Still breastfeeding and planning to stop in March 2017. Milk available on L breast only.    No breast, ovarian or colon screening to date.    At this visit, she reports feeling well, and denies fatigue, breast pain,  nipple discharge, asymmetry, lumps, thickenings or masses in her breasts, skin changes and nipple changes.  She denies early satiety, urinary urgency or frequency, abdominal bloating and pain. She states isn't really sure when her menstrual cycles occur, because of the Mirena IUD.    Past Medical/Surgical History:  Past Medical History   Diagnosis Date     BRCA2 positive 12/2016     [id number]     Past Surgical History   Procedure Laterality Date     Ovarian cystectomy  2008     Leep tx, cervical  2002       Allergies:  Allergies as of 02/09/2017     (No Known Allergies)       Current Medications:  Current Outpatient Prescriptions   Medication Sig Dispense Refill     levonorgestrel  (MIRENA) 20 MCG/24HR IUD 1 each by Intrauterine route once          Family History:  Family History   Problem Relation Age of Onset     Ovarian Cancer Mother 64     Hereditary Breast and Ovarian Cancer Syndrome Mother      also BRCA2+     Lung Cancer Maternal Grandfather       at 60, history of smoking     Prostate Cancer Father 68     Brain Cancer Paternal Uncle       at 56     Leukemia Paternal Grandmother       at 56       Social History:  Social History     Social History     Marital Status:      Spouse Name: Abdiel     Number of Children: 3     Years of Education: N/A     Occupational History     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     Social History Main Topics     Smoking status: Former Smoker -- 0.50 packs/day for 8 years     Types: Cigarettes     Start date: 1999     Quit date: 2007     Smokeless tobacco: Former User     Quit date: 2007     Alcohol Use: No      Comment: 1-2 glasses of wine     Drug Use: Not on file     Sexual Activity:     Partners: Male     Birth Control/ Protection: IUD     Other Topics Concern      Service No     Blood Transfusions No     Occupational Exposure No     Hobby Hazards No     Sleep Concern No     Stress Concern No     Weight Concern No     Special Diet No     Back Care No     Exercise No     3 kids!     Bike Helmet No     Seat Belt Yes     Self-Exams Yes     Social History Narrative       Physical Exam:  /89 mmHg  Pulse 76  Temp(Src) 99.8  F (37.7  C) (Oral)  Resp 16  Wt 58.514 kg (129 lb)  SpO2 99%  Physical exam deferred.    Laboratory/Imaging Studies  Results for orders placed or performed during the hospital encounter of 16   BRCA2 single mutation known   Result Value Ref Range    Lab Scanned Result BRCA2 SINGLE MUT KWN-Scanned        ASSESSMENT  We discussed the differences between cancer risk for the general population and those with BRCA mutations. It is estimated that 10% of all women with  breast cancer have BRCA mutations.  It is estimated that 15% of all women with ovarian cancer have BRCA mutations. We also discussed that inheriting a mutation does not mean that a person will develop cancer, but rather that they are at increased risk.     Current literature shows that women with BRCA 2 mutations have a 40-80% lifetime risk of breast cancer, compared to the general population risk of 12%. Those with a BRCA2 mutation also bear an approximate 16.5-18% lifetime risk of ovarian cancer, compared to the 1-2% lifetime risk within the general population. The mean age diagnosis of ovarian cancer for BRCA1/2 carriers is estimated to be 50.8 years old.  There is also an increased risk of pancreatic cancer and melanoma.    Women with BRCA2 mutations have an estimated 49-55% cumulative risk of breast cancer by age 70.   The risk of developing contralateral breast cancer within BRCA2 carriers  is higher when the age at first diagnosis is less than age 40. It is estimated that in women whose breast cancer was first diagnosed at age 50 or older, the risk of contralateral breast cancer is 8 percent at 10 years from the original diagnosis and 20 percent at 25 years from the original diagnosis. (Nadia KRAMER, Balbir C, Esteban K et al. (2009). Contralateral breast cancer risk in BRCA1 and BRCA2 mutation carriers. Journal of Clinical Oncology. 27: 1487.)     We also discussed the risks and benefits of prophylactic bilateral salpingo-ooperectomy. Research involving 10 studies of BRCA1 and BRCA2 mutation carriers showed an 80% reduction the risk for fallopian tube and ovarian cancer following risk reducing salpingo-oophorectomy, with a residual risk of peritoneal carcinoma of 1-2%. Research shows the a bilateral salpingo-ooperectomy can also reduce the risk of breast cancer by 50%, especially in women 40 years old and younger.  She was also given information about the Clinical Trial at [facility], in which she could elect to have  her fallopian tubes removed now and an oophorectomy in the future.  She is interested in this as an option as it would afford her a safe method of birth control as well.     We discussed the recommendations regarding consideration of a prophylactic mastectomy.  She is not interested in that at this time.    We briefly discussed the option of tamoxifen as a preventative agent; she would not be eligible for that at this time and may not be able to take it if she participates in the Clinical Trial involving the salpingectomy.    We reviewed signs and symptoms to be watchful for and she practiced palpating with the clinic's breast simulation model. She verbalized understanding of signs and symptoms to address with her physicians including vaginal bleeding, unresolved bloating, pain, tenderness, early satiety and lumps, thickening, swelling, tenderness, nipple discharge or changes in skin of breasts.    We also discussed following  a healthy lifestyle plan recommended by both NCCN and the American Cancer Society that can reduce the risk of cancer:  1 Limit alcohol consumption to less than 1 drink per day (1 drink=5 oz.wine, 12 oz. Beer or 1.5 oz. 80-proof liquor). She has 1-2 glasses of wine per week.  2. Exercise per American Cancer Society guidelines of at least 150 minutes of moderate-intensity activity or 75 minutes of vigorous activity each week. (Or a combination of both.) Exercise should be spread  out over the week. She doesn't have a formal exercise program, but she has 3 young children and is very active with them.  3. Maintain a healthy weight with a Body Mass Index between 19-24.9. Her BMI is 20.19.  4. Do not use tobacco products and limit exposure to passive smoke. She stopped smoking at age 26.    Because she has a heightened risk of melanoma, I am referring her to [facility] [place] for a dermatology exam. She may consider this annually or more frequently, as needed.    INDIVIDUALIZED  CANCER RISK MANAGEMENT PLAN:Individualized Surveillance Plan for women  Hereditary Breast and/or Ovarian Cancer Syndrome   Per NCCN Guidelines Version 2.2017   Recommended screening Test or procedure Last done Next Scheduled    Breast self awareness- starting at age 18. Women should be familiar with their breasts and promptly report changes to their care provider. Periodic, consistent self exam may facilitate breast self awareness.  2/9/2017 Sept. 2017   Breast screening, starting at age 25 Clinical breast exams every 6 -12 months 2/9/2017 Sept. 2017   Breast screening   Age 25-29 Annual breast MRI screening with contrast (preferred) or mammogram if MRI is unavailable or individualized based on family history if a breast cancer diagnosis before age 30 is present.       Breast MRI is performed preferably on day 7-15 of menstrual cycle for premenopausal women.   NA   NA   Breast screening   Age >30-75 years     Annual mammogram and   annual breast MRI, preferably on day 7-15 of menstrual cycle for premenopausal women.    Age>75 years, management should be considered on an individual basis.  NEXT: Baseline tomosynthesis mammogram  in Sept. 2017 after exam (6 months after completion of breastfeeding)   Ovarian cancer screening, starting at age 30-35 Consider transvaginal ultrasound and  tests.  and pelvic US ordered today Sept. 2017   Recommendation: risk-reducing salpingo-oophorectomy, typically between 35 and 40 years old and  upon completion of child bearing.     Because ovarian cancer onset in patients with BRCA2 mutations is on average of 8-10 years later than in patients with BRCA1 mutations, it is reasonable to delay risk-reducing salpingo-oophorectomy until 40-45 years in patients with BRCA2 mutations who have already maximized their breast cancer prevention (i.e., undergone bilateral mastectomy).    Salpingectomy alone is not the standard of care for risk reduction although clinical trials are  ongoing.     Discuss option of risk-reducing mastectomy.    Consider risks and benefits of risk reducing agents, such as tamoxifen and raloxifene for breast and ovarian cancer.    Consider a full body skin and eye exam for melanoma screening for both BRCA1+ and BRCA2+.     NOTE: Women with BRCA mutation who are treated for breast cancer should have screening of the remaining breast tissue with annual mammography and breast MRI.    The International Cancer of the Pancreas Screening (CAPS) Consortium recommends that individuals with a BRCA2 mutation who have at least one first-degree relative with pancreatic cancer should undergo initial screening with endoscopic ultrasonography and/or MRI/magnetic resonance cholangiopancreatography.     I will be happy to work with her to manage her cancer risk, will follow up on her screenings and see her in the Cancer Risk Management clinic in six months.    I spent 50 minutes with the patient with greater that 50% of it in counseling and coordinating care as documented above.    Krupa Skinner, THOR-CNS, OCN, ANG-BC  Clinical Nurse Specialist  Cancer Risk Management Program  [address]  [place] [address]    phone:  [phone]  Pager: [phone]  fax: [phone]    Cc: Marianne Uribe MD

## 2017-02-09 NOTE — PROGRESS NOTES
Vee Rae is a 36 year old female who presents for:  Chief Complaint   Patient presents with     Oncology Clinic Visit     Family hx of ovarian cancer        Initial Vitals:  /89 mmHg  Pulse 76  Temp(Src) 99.8  F (37.7  C) (Oral)  Resp 16  Wt 58.514 kg (129 lb)  SpO2 99% There is no height on file to calculate BMI.. There is no height on file to calculate BSA. BP completed using cuff size: regular  No Pain (0) No LMP recorded. Allergies and medications reviewed.     Medications: Medication refills not needed today.  Pharmacy name entered into EPIC: Data Unavailable    Comments: New patientFamily hx of ovarian cancer     5 minutes for nursing intake (face to face time)   Jeana Cortes MA    Medical Assistant Note:  Vee Rae presents today for labs.    Patient seen by provider today: Yes: The patient was here today for genetic counseling Krupa Skinner.   present during visit today: Not Applicable.    Concerns: No Concerns.    Procedure:  Lab draw site: LAC, Needle type: BF, Gauge: 21 g, gauze and coban applied.    Post Assessment:  Labs drawn without difficulty: Yes.    Discharge Plan:  Departure Mode: Ambulatory.    Jeana Cortes MA

## 2017-02-09 NOTE — PATIENT INSTRUCTIONS
Individualized Surveillance Plan for women  Hereditary Breast and/or Ovarian Cancer Syndrome   Per NCCN Guidelines Version 2.2017   Recommended screening Test or procedure Last done Next Scheduled    Breast self awareness- starting at age 18. Women should be familiar with their breasts and promptly report changes to their care provider. Periodic, consistent self exam may facilitate breast self awareness.  2/9/2017 Sept. 2017   Breast screening, starting at age 25 Clinical breast exams every 6 -12 months 2/9/2017 Sept. 2017   Breast screening   Age 25-29 Annual breast MRI screening with contrast (preferred) or mammogram if MRI is unavailable or individualized based on family history if a breast cancer diagnosis before age 30 is present.       Breast MRI is performed preferably on day 7-15 of menstrual cycle for premenopausal women.   NA   NA   Breast screening   Age >30-75 years     Annual mammogram and   annual breast MRI, preferably on day 7-15 of menstrual cycle for premenopausal women.    Age>75 years, management should be considered on an individual basis. Baseline mammogram ordered today NEXT: Breast MRI in August 2017 after exam   Ovarian cancer screening, starting at age 30-35 Consider transvaginal ultrasound and  tests.  and pelvic US ordered today Sept. 2017   Recommendation: risk-reducing salpingo-oophorectomy, typically between 35 and 40 years old and  upon completion of child bearing.     Because ovarian cancer onset in patients with BRCA2 mutations is on average of 8-10 years later than in patients with BRCA1 mutations, it is reasonable to delay risk-reducing salpingo-oophorectomy until 40-45 years in patients with BRCA2 mutations who have already maximized their breast cancer prevention (i.e., undergone bilateral mastectomy).    Salpingectomy alone is not the standard of care for risk reduction although clinical trials are ongoing.     Discuss option of risk-reducing mastectomy.    Consider  risks and benefits of risk reducing agents, such as tamoxifen and raloxifene for breast and ovarian cancer.    Consider a full body skin and eye exam for melanoma screening for both BRCA1+ and BRCA2+.     NOTE: Women with BRCA mutation who are treated for breast cancer should have screening of the remaining breast tissue with annual mammography and breast MRI.    The International Cancer of the Pancreas Screening (CAPS) Consortium recommends that individuals with a BRCA2 mutation who have at least one first-degree relative with pancreatic cancer should undergo initial screening with endoscopic ultrasonography and/or MRI/magnetic resonance cholangiopancreatography.         Magnetic Resonance Imaging (MRI) of the Breast  Magnetic resonance imaging (MRI) of the breast is an imaging test that uses strong magnets and radio waves to form pictures of the inside of the breast. It also creates images of the tissues that surround the breast. Breast MRI is used to check for problems, such as a leaking breast implant or a suspicious lump or mass. It can also be used to help determine if breast cancer is present and aid in diagnosis and management. Most MRI tests take 30 to 60 minutes. Depending on the type of MRI you are having, the test may take longer. Give yourself extra time to check in.      During a breast MRI, you lie face down on a platform that slides into a tubelike machine called a scanner.   Before your test    You may need to stop eating or drinking before the test. Each health care facility has its own guidelines on this. It also depends on the type of exam you are having. Ask your health care provider if you should stop eating or drinking before the test.    Ask your provider if you should stop taking any medicine before the test.    Follow your normal daily routine unless your provider tells you otherwise.    You ll be asked to remove your watch, hearing aids, credit cards, pens, eyeglasses, and other metal  objects.    You may be asked to remove your makeup. Makeup may contain some metal.  MRI uses strong magnets. Metal is affected by magnets and can distort the image. The magnet used in MRI can cause metal objects in your body to move. If you have a metal implant, you may not be able to have an MRI unless the implant is certified as MRI safe. People with these implants should not have an MRI:    Ear (cochlear) implants    Certain clips used for brain aneurysms    Certain metal coils put in blood vessels    Most defibrillators    Most pacemakers  The radiologist or technologist may ask you if you:    Have had stereotactic breast biopsy    Have a pacemaker    Have an artificial body part (prosthesis)    Have metal rods, screws, plates, or splinters in your body    Wear a medicated adhesive patch    Have tattoos or body piercings. Some tattoo inks contain metal.    Have implanted nerve stimulators or drug-infusion ports    Work with metal    Have braces. You must remove any dental work.    Have a bullet or other metal in your body  Tell the radiologist or technologist if you:    Are allergic to X-ray dye (contrast medium), iodine, shellfish, or any medicines    Are pregnant or think you may be pregnant    Are afraid of small, enclosed spaces (claustrophobic)    Have any serious health problems. This includes kidney disease or a liver transplant. You may not be able to have the contrast material used for MRI.    Have had previous surgeries    Are breastfeeding   During your test    You may be asked to wear a hospital gown.    You may be given earplugs to wear if you desire.    You may be injected with contrast. This is a special dye that makes the MRI image sharp that may be needed depending on the reason for your exam.    You ll lie on a platform that slides into a tubelike machine called a scanner. You ll be on your stomach with your breasts placed through openings in the platform.    Remain as still as you can while  the camera takes the pictures. This will ensure the best images.  After your test    You can get back to normal activities right away.    If you were given contrast, it will pass naturally through your body within a day.    Drink lots of water so that the dye passes quickly out of your body.  Getting your results  Your health care provider will discuss the test results with you during a follow-up appointment or over the phone.    1798-5022 [employer]. [address] [place] [address], [place] [address]. All rights reserved. This information is not intended as a substitute for professional medical care. Always follow your healthcare professional's instructions.        Mammography  Mammography is an X-ray exam of your breast tissue. The image it makes is called a mammogram. A mammogram can help find problems with your breasts, such as cysts or cancer. Mammography is the best breast cancer screening tool available.     Have screening mammograms and professional breast exams as often as your healthcare provider recommends. Also, be sure you know how your breasts normally look and feel. This makes it easier to notice any changes. Report changes to your healthcare provider as soon as possible.    How do I get ready for a mammogram?     Schedule the test for 1 week after your period. Your breasts are less sore then.    Make sure your clinic gets images of your last mammogram if it was done somewhere else. This lets the provider compare the 2 sets of images for any changes.    On the morning of your test, don t use deodorant, powder, or perfume.    Wear a top that you can take off easily.    What happens during a mammogram?     You will need to undress from the waist up.    The technologist will position your breast to get the best test results.    Each of your breasts will be compressed one at a time. This helps get the most complete X-ray image.    Your breasts will be repositioned to get at least 2 separate views of  each breast.  What happens after a mammogram?    More X-rays are sometimes needed. You ll be called to schedule them.    You should receive your test results in writing. Ask about this on the day of your appointment.    Have mammograms as often as your healthcare provider recommends.  Let the technologist know if:    You re pregnant or think you may be pregnant    You have breast implants    You have any scars or moles on or near your breasts    You ve had a breast biopsy or surgery    You re breastfeeding     9462-4161 [employer]. [address] [place], [place] [id number]. All rights reserved. This information is not intended as a substitute for professional medical care. Always follow your healthcare professional's instructions.        Transvaginal Ultrasound (Endovaginal Ultrasound)  A transvaginal ultrasound is an imaging test. An ultrasound uses sound waves to form pictures of your organs that can be seen on a screen. It is often done with a probe placed on the belly. A transvaginal ultrasound uses a special probe that is put into your vagina. It uses sound waves to make pictures of your uterus, ovaries, and other pelvic organs. This test can be used to check symptoms such as pain. It can also check for problems. In pregnant women, it is used to check the unborn baby or fetus. The test is often done by a specially trained technologist called a sonographer.    Getting ready for your test    You may be asked to go to the bathroom. Your bladder may need to be empty before the test.    Tell the sonographer what medicines you take. Let him or her know if you have had pelvic surgery.    Answer any other questions the sonographer asks. Your answers will help him or her adapt the test to your health needs.  During your test    You may change into a hospital gown. You'll then lie down on an exam table with your knees raised, as you would for a pelvic exam.    The sonographer will use a thin hand-held  probe. It is shaped like a tampon. The probe has a sterile cover and nongreasy gel. It is put inside your vagina. In some cases, you may be asked to put the probe in yourself, as you would a tampon.    The sonographer moves the probe to get the best picture. You may feel pressure. Tell the sonographer if you feel pain.  After your test  Before leaving, you may need to wait for a short time while the images are reviewed. You can go back to your normal routine right after the test. Your doctor will let you know when the results of your test are ready.  Be aware that although the sonographer can answer questions about the test, only a doctor can explain the results.     9834-1040 [employer]. [address] [place] [address], [place] [address]. All rights reserved. This information is not intended as a substitute for professional medical care. Always follow your healthcare professional's instructions.

## 2017-10-09 ENCOUNTER — TELEPHONE (OUTPATIENT)
Dept: ONCOLOGY | Facility: CLINIC | Age: 37
End: 2017-10-09

## 2017-10-09 NOTE — TELEPHONE ENCOUNTER
Spoke with patient. Patient stated she was at work, was not a good time. Patient will call back to reschedule appointment with Krupa Skinner.

## 2018-01-07 ENCOUNTER — HEALTH MAINTENANCE LETTER (OUTPATIENT)
Age: 38
End: 2018-01-07

## 2019-07-09 NOTE — Clinical Note
Cancer Risk Management  Program Locations    [facility]  [facility]  [facility]  Mailing Address  Cancer Risk Management Program  [facility]  [address]  [place] [address]    New patient appointments  [phone]  February 9, 2017    Vee Rae  [address] [address] [address]      Dear Vee,    It was a pleasure meeting you today.  I will send you your results of your tests through [employer]huddle. Please let me know if you have any questions or concerns.  Below is a copy of my note from our visit, outlining your plan.  I look forward to seeing you in September.    Vee Rae is a 36 year old female who presents for:  Chief Complaint   Patient presents with     Oncology Clinic Visit     Family hx of ovarian cancer        Initial Vitals:  /89 mmHg  Pulse 76  Temp(Src) 99.8  F (37.7  C) (Oral)  Resp 16  Wt 58.514 kg (129 lb)  SpO2 99% There is no height on file to calculate BMI.. There is no height on file to calculate BSA. BP completed using cuff size: regular  No Pain (0) No LMP recorded. Allergies and medications reviewed.     Medications: Medication refills not needed today.  Pharmacy name entered into EPIC: Data Unavailable    Comments: New patientFamily hx of ovarian cancer     5 minutes for nursing intake (face to face time)   Jeana Cortes MA    Medical Assistant Note:  Vee Rae presents today for labs.    Patient seen by provider today: Yes: The patient was here today for genetic counseling Krupa Skinner.   present during visit today: Not Applicable.    Concerns: No Concerns.    Procedure:  Lab draw site: LAC, Needle type: BF, Gauge: 21 g, gauze and coban applied.    Post Assessment:  Labs drawn without difficulty: Yes.    Discharge Plan:  Departure Mode: Ambulatory.    Jeana Cortes  MA              Oncology Risk Management Consultation:  Date on this visit: 2/9/2017    Vee Rae  is referred by Janie Gonzalez, Certified Genetic Counselor, for an oncology risk management consultation. She requires evaluation for her risk of cancer secondary to having a deleterious BRCA2 mutation and is considered to be at high risk for hereditary breast and ovarian cancer.    Primary Physician: Marianne Uribe MD    History Of Present Illness:  Ms. Rae is a very pleasant, healthy 36 year old female who presents with family history of breast and ovarian cancer and a personal diagnosis of a BRCA2 mutation.     Pertinent history:  12/2016 - BRCA2+ genetic mutation found, using single site analysis through [facility].  The genetic testing was done to determine whether she had the same genetic mutation as her mother, who had ovarian cancer at age 64.    Menarche at age 14.  First child at age 30.  Birth control: Mirena IUD, since 2016.   1st child for 6 months, 2nd child for 10 months and 3rd child for 13-14 months. Still breastfeeding and planning to stop in March 2017. Milk available on L breast only.    No breast, ovarian or colon screening to date.    At this visit, she reports feeling well, and denies fatigue, breast pain,  nipple discharge, asymmetry, lumps, thickenings or masses in her breasts, skin changes and nipple changes.  She denies early satiety, urinary urgency or frequency, abdominal bloating and pain. She states isn't really sure when her menstrual cycles occur, because of the Mirena IUD.    Past Medical/Surgical History:  Past Medical History   Diagnosis Date     BRCA2 positive 12/2016     [id number]     Past Surgical History   Procedure Laterality Date     Ovarian cystectomy  2008     Leep tx, cervical  2002       Allergies:  Allergies as of 02/09/2017     (No Known Allergies)       Current Medications:  Current Outpatient Prescriptions   Medication Sig Dispense Refill      levonorgestrel (MIRENA) 20 MCG/24HR IUD 1 each by Intrauterine route once          Family History:  Family History   Problem Relation Age of Onset     Ovarian Cancer Mother 64     Hereditary Breast and Ovarian Cancer Syndrome Mother      also BRCA2+     Lung Cancer Maternal Grandfather       at 60, history of smoking     Prostate Cancer Father 68     Brain Cancer Paternal Uncle       at 56     Leukemia Paternal Grandmother       at 56       Social History:  Social History     Social History     Marital Status:      Spouse Name: Abdiel     Number of Children: 3     Years of Education: N/A     Occupational History     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     Social History Main Topics     Smoking status: Former Smoker -- 0.50 packs/day for 8 years     Types: Cigarettes     Start date: 1999     Quit date: 2007     Smokeless tobacco: Former User     Quit date: 2007     Alcohol Use: No      Comment: 1-2 glasses of wine     Drug Use: Not on file     Sexual Activity:     Partners: Male     Birth Control/ Protection: IUD     Other Topics Concern      Service No     Blood Transfusions No     Occupational Exposure No     Hobby Hazards No     Sleep Concern No     Stress Concern No     Weight Concern No     Special Diet No     Back Care No     Exercise No     3 kids!     Bike Helmet No     Seat Belt Yes     Self-Exams Yes     Social History Narrative       Physical Exam:  /89 mmHg  Pulse 76  Temp(Src) 99.8  F (37.7  C) (Oral)  Resp 16  Wt 58.514 kg (129 lb)  SpO2 99%  Physical exam deferred.    Laboratory/Imaging Studies  Results for orders placed or performed during the hospital encounter of 16   BRCA2 single mutation known   Result Value Ref Range    Lab Scanned Result BRCA2 SINGLE MUT KWN-Scanned        ASSESSMENT  We discussed the differences between cancer risk for the general population and those with BRCA mutations. It is estimated that 10% of all  women with breast cancer have BRCA mutations.  It is estimated that 15% of all women with ovarian cancer have BRCA mutations. We also discussed that inheriting a mutation does not mean that a person will develop cancer, but rather that they are at increased risk.     Current literature shows that women with BRCA 2 mutations have a 40-80% lifetime risk of breast cancer, compared to the general population risk of 12%. Those with a BRCA2 mutation also bear an approximate 16.5-18% lifetime risk of ovarian cancer, compared to the 1-2% lifetime risk within the general population. The mean age diagnosis of ovarian cancer for BRCA1/2 carriers is estimated to be 50.8 years old.  There is also an increased risk of pancreatic cancer and melanoma.    Women with BRCA2 mutations have an estimated 49-55% cumulative risk of breast cancer by age 70.   The risk of developing contralateral breast cancer within BRCA2 carriers  is higher when the age at first diagnosis is less than age 40. It is estimated that in women whose breast cancer was first diagnosed at age 50 or older, the risk of contralateral breast cancer is 8 percent at 10 years from the original diagnosis and 20 percent at 25 years from the original diagnosis. (Nadia KRAMER, Balbir C, Esteban K et al. (2009). Contralateral breast cancer risk in BRCA1 and BRCA2 mutation carriers. Journal of Clinical Oncology. 27: 5887.)     We also discussed the risks and benefits of prophylactic bilateral salpingo-ooperectomy. Research involving 10 studies of BRCA1 and BRCA2 mutation carriers showed an 80% reduction the risk for fallopian tube and ovarian cancer following risk reducing salpingo-oophorectomy, with a residual risk of peritoneal carcinoma of 1-2%. Research shows the a bilateral salpingo-ooperectomy can also reduce the risk of breast cancer by 50%, especially in women 40 years old and younger.  She was also given information about the Clinical Trial at [facility], in which she could  elect to have her fallopian tubes removed now and an oophorectomy in the future.  She is interested in this as an option as it would afford her a safe method of birth control as well.     We discussed the recommendations regarding consideration of a prophylactic mastectomy.  She is not interested in that at this time.    We briefly discussed the option of tamoxifen as a preventative agent; she would not be eligible for that at this time and may not be able to take it if she participates in the Clinical Trial involving the salpingectomy.    We reviewed signs and symptoms to be watchful for and she practiced palpating with the clinic's breast simulation model. She verbalized understanding of signs and symptoms to address with her physicians including vaginal bleeding, unresolved bloating, pain, tenderness, early satiety and lumps, thickening, swelling, tenderness, nipple discharge or changes in skin of breasts.    We also discussed following  a healthy lifestyle plan recommended by both NCCN and the American Cancer Society that can reduce the risk of cancer:  1 Limit alcohol consumption to less than 1 drink per day (1 drink=5 oz.wine, 12 oz. Beer or 1.5 oz. 80-proof liquor). She has 1-2 glasses of wine per week.  2. Exercise per American Cancer Society guidelines of at least 150 minutes of moderate-intensity activity or 75 minutes of vigorous activity each week. (Or a combination of both.) Exercise should be spread  out over the week. She doesn't have a formal exercise program, but she has 3 young children and is very active with them.  3. Maintain a healthy weight with a Body Mass Index between 19-24.9. Her BMI is 20.19.  4. Do not use tobacco products and limit exposure to passive smoke. She stopped smoking at age 26.    Because she has a heightened risk of melanoma, I am referring her to [facility] [place] for a dermatology exam. She may consider this annually or more frequently, as  needed.    INDIVIDUALIZED CANCER RISK MANAGEMENT PLAN:Individualized Surveillance Plan for women  Hereditary Breast and/or Ovarian Cancer Syndrome   Per NCCN Guidelines Version 2.2017   Recommended screening Test or procedure Last done Next Scheduled    Breast self awareness- starting at age 18. Women should be familiar with their breasts and promptly report changes to their care provider. Periodic, consistent self exam may facilitate breast self awareness.  2/9/2017 Sept. 2017   Breast screening, starting at age 25 Clinical breast exams every 6 -12 months 2/9/2017 Sept. 2017   Breast screening   Age 25-29 Annual breast MRI screening with contrast (preferred) or mammogram if MRI is unavailable or individualized based on family history if a breast cancer diagnosis before age 30 is present.       Breast MRI is performed preferably on day 7-15 of menstrual cycle for premenopausal women.   NA   NA   Breast screening   Age >30-75 years     Annual mammogram and   annual breast MRI, preferably on day 7-15 of menstrual cycle for premenopausal women.    Age>75 years, management should be considered on an individual basis.  NEXT: Baseline tomosynthesis mammogram  in Sept. 2017 after exam (6 months after completion of breastfeeding)   Ovarian cancer screening, starting at age 30-35 Consider transvaginal ultrasound and  tests.  and pelvic US ordered today Sept. 2017   Recommendation: risk-reducing salpingo-oophorectomy, typically between 35 and 40 years old and  upon completion of child bearing.     Because ovarian cancer onset in patients with BRCA2 mutations is on average of 8-10 years later than in patients with BRCA1 mutations, it is reasonable to delay risk-reducing salpingo-oophorectomy until 40-45 years in patients with BRCA2 mutations who have already maximized their breast cancer prevention (i.e., undergone bilateral mastectomy).    Salpingectomy alone is not the standard of care for risk reduction although  clinical trials are ongoing.     Discuss option of risk-reducing mastectomy.    Consider risks and benefits of risk reducing agents, such as tamoxifen and raloxifene for breast and ovarian cancer.    Consider a full body skin and eye exam for melanoma screening for both BRCA1+ and BRCA2+.     NOTE: Women with BRCA mutation who are treated for breast cancer should have screening of the remaining breast tissue with annual mammography and breast MRI.    The International Cancer of the Pancreas Screening (CAPS) Consortium recommends that individuals with a BRCA2 mutation who have at least one first-degree relative with pancreatic cancer should undergo initial screening with endoscopic ultrasonography and/or MRI/magnetic resonance cholangiopancreatography.     I will be happy to work with her to manage her cancer risk, will follow up on her screenings and see her in the Cancer Risk Management clinic in six months.    I spent 50 minutes with the patient with greater that 50% of it in counseling and coordinating care as documented above.    Krupa Skinner, THOR-CNS, OCN, ANG-BC  Clinical Nurse Specialist  Cancer Risk Management Program  [address]  [place] [address]    phone:  [phone]  Pager: [phone]  fax: [phone]    Cc: Marianne Uribe MD                                                     Size Of Lesion: 3mm Detail Level: Detailed

## 2020-03-10 ENCOUNTER — HEALTH MAINTENANCE LETTER (OUTPATIENT)
Age: 40
End: 2020-03-10

## 2020-12-27 ENCOUNTER — HEALTH MAINTENANCE LETTER (OUTPATIENT)
Age: 40
End: 2020-12-27

## 2021-04-24 ENCOUNTER — HEALTH MAINTENANCE LETTER (OUTPATIENT)
Age: 41
End: 2021-04-24

## 2021-10-09 ENCOUNTER — HEALTH MAINTENANCE LETTER (OUTPATIENT)
Age: 41
End: 2021-10-09

## 2022-05-16 ENCOUNTER — HEALTH MAINTENANCE LETTER (OUTPATIENT)
Age: 42
End: 2022-05-16

## 2022-09-11 ENCOUNTER — HEALTH MAINTENANCE LETTER (OUTPATIENT)
Age: 42
End: 2022-09-11

## 2023-06-03 ENCOUNTER — HEALTH MAINTENANCE LETTER (OUTPATIENT)
Age: 43
End: 2023-06-03

## 2024-02-24 ENCOUNTER — HEALTH MAINTENANCE LETTER (OUTPATIENT)
Age: 44
End: 2024-02-24